# Patient Record
Sex: FEMALE | URBAN - METROPOLITAN AREA
[De-identification: names, ages, dates, MRNs, and addresses within clinical notes are randomized per-mention and may not be internally consistent; named-entity substitution may affect disease eponyms.]

---

## 2021-07-01 ENCOUNTER — APPOINTMENT (RX ONLY)
Dept: URBAN - METROPOLITAN AREA CLINIC 105 | Facility: CLINIC | Age: 56
Setting detail: DERMATOLOGY
End: 2021-07-01

## 2021-07-01 DIAGNOSIS — L72.8 OTHER FOLLICULAR CYSTS OF THE SKIN AND SUBCUTANEOUS TISSUE: ICD-10-CM

## 2021-07-01 PROCEDURE — ? COUNSELING

## 2021-07-01 PROCEDURE — ? DIAGNOSIS COMMENT

## 2021-07-01 PROCEDURE — 99202 OFFICE O/P NEW SF 15 MIN: CPT

## 2021-07-01 PROCEDURE — ? OBSERVATION AND MEASURE

## 2021-07-01 PROCEDURE — ? OTHER

## 2021-07-01 ASSESSMENT — LOCATION DETAILED DESCRIPTION DERM
LOCATION DETAILED: RIGHT INFERIOR FOREHEAD
LOCATION DETAILED: RIGHT INFERIOR LATERAL FOREHEAD

## 2021-07-01 ASSESSMENT — LOCATION ZONE DERM: LOCATION ZONE: FACE

## 2021-07-01 ASSESSMENT — LOCATION SIMPLE DESCRIPTION DERM: LOCATION SIMPLE: RIGHT FOREHEAD

## 2021-07-01 NOTE — PROCEDURE: DIAGNOSIS COMMENT
Comment: Recommended definitive removal with Dr. Murphy if desired.
Detail Level: Simple
Render Risk Assessment In Note?: no

## 2021-07-01 NOTE — PROCEDURE: OTHER
Detail Level: Zone
Note Text (......Xxx Chief Complaint.): This diagnosis correlates with the
Other (Free Text): Treatment Number: 1\\nLesions Injected: 1\\nSize of Lesion: 1 cm\\nMedication Injected: 5.0 mg/cc of Kenalog\\nTotal Volume Injected: 0.2cc\\nLot Number: [id number]\\nExpiration Date: 06/2022\\nAdministered by: CF\\n\\nThe risks of atrophy were reviewed with the patient. This procedure was medically necessary because the lesions that were treated were: enlarging and inflamed.
Render Risk Assessment In Note?: no